# Patient Record
Sex: MALE | Race: WHITE | NOT HISPANIC OR LATINO | Employment: OTHER | URBAN - METROPOLITAN AREA
[De-identification: names, ages, dates, MRNs, and addresses within clinical notes are randomized per-mention and may not be internally consistent; named-entity substitution may affect disease eponyms.]

---

## 2019-01-31 NOTE — PATIENT DISCUSSION
POAG END STAGE OD&gt; OS S/P TRAB AND CATARACT OU (2008): INTRAOCULAR PRESSURE IS WITHIN ACCEPTABLE LIMITS OU. CONTINUE ALPHAGAN TWICE A DAY INTO BOTH EYES AND LUMIGAN AT BEDTIME INTO BOTH EYES TO MANAGE INTRAOCULAR PRESSURE. RETURN FOR FOLLOW-UP AS SCHEDULED.

## 2019-03-08 NOTE — PATIENT DISCUSSION
POAG, OU: INTRAOCULAR PRESSURE IS WITHIN ACCEPTABLE LIMITS. THIN PACHS. SEVERELY REDUCED FIELD OS, UNABLE OD. FIRST FIELD. WILL COMPLETE 10-2 AT NEXT FIELD. PT INSTRUCTED TO CONTINUE ALPHAGAN, DORZOLAMIDE AND LUMIGAN AND RETURN FOR FOLLOW-UP AS SCHEDULED.

## 2019-07-12 NOTE — PATIENT DISCUSSION
POAG, OU: INTRAOCULAR PRESSURE IS WITHIN ACCEPTABLE LIMITS. PT INSTRUCTED TO CONTINUE ALPHAGAN, DORZOLAMIDE, AND LUMIGAN AND RETURN FOR FOLLOW-UP AS SCHEDULED.

## 2020-01-15 NOTE — PATIENT DISCUSSION
POAG, OU-S/P TRAB OU: INTRAOCULAR PRESSURE IS WITHIN ACCEPTABLE LIMITS. PT INSTRUCTED TO CONTINUE ALPHAGAN, DORZOLAMIDE, AND LUMIGAN AND RETURN FOR FOLLOW-UP AS SCHEDULED. WILL HAVE PT RETURN FOR PRESSURE CHECK.

## 2020-07-17 NOTE — PATIENT DISCUSSION
POAG, OU: INTRAOCULAR PRESSURE IS WITHIN ACCEPTABLE LIMITS. TRAB PATENT. PT INSTRUCTED TO CONTINUE LUMIGAN, DORZOLAMIDE AND ALPHAGAN AND RETURN FOR FOLLOW-UP AS SCHEDULED.

## 2021-02-08 NOTE — PATIENT DISCUSSION
POAG, OU: INTRAOCULAR PRESSURE IS WITHIN ACCEPTABLE LIMITS. PT INSTRUCTED TO CONTINUE DORZOLAMIDE BID OU ALPHAGAN BID OU, AND LUMIGAN QHS OU  AND RETURN FOR FOLLOW-UP AS SCHEDULED.

## 2021-08-09 NOTE — PATIENT DISCUSSION
POAG, OU: INTRAOCULAR PRESSURE IS WITHIN ACCEPTABLE LIMITS. PT INSTRUCTED TO CONTINUE ALPHAGAN LUMIGAN &amp; DORZOLAMIDE AS DIRECTED AND RETURN FOR FOLLOW-UP AS SCHEDULED.

## 2021-11-30 ENCOUNTER — NEW PATIENT (OUTPATIENT)
Dept: URBAN - METROPOLITAN AREA CLINIC 17 | Facility: CLINIC | Age: 56
End: 2021-11-30

## 2021-11-30 DIAGNOSIS — H25.13: ICD-10-CM

## 2021-11-30 PROCEDURE — 92015 DETERMINE REFRACTIVE STATE: CPT

## 2021-11-30 PROCEDURE — 99204 OFFICE O/P NEW MOD 45 MIN: CPT

## 2021-11-30 ASSESSMENT — VISUAL ACUITY
OU_CC: 20/20
OD_CC: 20/20-1
OS_CC: 20/20

## 2021-11-30 ASSESSMENT — TONOMETRY
OD_IOP_MMHG: 11
OS_IOP_MMHG: 11

## 2022-02-17 NOTE — PATIENT DISCUSSION
INTRAOCULAR PRESSURE AND RNFL ARE WITHIN ACCEPTABLE LIMITS. PT INSTRUCTED TO CONTINUE ALPHAGAN LUMIGAN & DORZOLAMIDE AS DIRECTED AND RETURN FOR FOLLOW-UP AS SCHEDULED.

## 2022-12-14 ENCOUNTER — ESTABLISHED PATIENT (OUTPATIENT)
Dept: URBAN - METROPOLITAN AREA CLINIC 17 | Facility: CLINIC | Age: 57
End: 2022-12-14

## 2022-12-14 PROCEDURE — 92014 COMPRE OPH EXAM EST PT 1/>: CPT

## 2022-12-14 PROCEDURE — 92015 DETERMINE REFRACTIVE STATE: CPT

## 2022-12-14 ASSESSMENT — VISUAL ACUITY
OD_CC: 20/20
OS_CC: 20/20

## 2022-12-14 ASSESSMENT — TONOMETRY
OS_IOP_MMHG: 13
OD_IOP_MMHG: 14
